# Patient Record
(demographics unavailable — no encounter records)

---

## 2025-06-27 NOTE — HEALTH RISK ASSESSMENT
[Good] : ~his/her~  mood as  good [Yes] : Yes [2 - 3 times a week (3 pts)] : 2 - 3  times a week (3 points) [1 or 2 (0 pts)] : 1 or 2 (0 points) [Never (0 pts)] : Never (0 points) [No] : In the past 12 months have you used drugs other than those required for medical reasons? 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

## 2025-06-27 NOTE — HISTORY OF PRESENT ILLNESS
[FreeTextEntry1] : CPE [de-identified] : 62yo male with PMH of T2DM, hypercholesterolemia who presents to the office for annual physical examination.   Patient reports that he is feeling well today.   He is concerned about left ear congestion. Reports sensation of the ear being clogged. Admits itchy as well. Reports constant for the past year.   Performed colonoscopy with [facility]  last year in 2024, reports he was told to repeat in 10 years.   Has been experiencing intermittent left chest discomfort, seen by cardiologist for work up and told normal. He believes the pain may be due to his history of cervical spine disc herniations. He did undergo cortisone injections with [facility] about 1.5 years ago and reports the pain was worse, so he stopped following up.   Had vitiligo after having COVID-19 vaccination. Follows with dermatologist.

## 2025-06-27 NOTE — REVIEW OF SYSTEMS
[Earache] : earache [Joint Pain] : joint pain [Muscle Pain] : muscle pain [Fever] : no fever [Chills] : no chills [Vision Problems] : no vision problems [Nasal Discharge] : no nasal discharge [Sore Throat] : no sore throat [Chest Pain] : no chest pain [Palpitations] : no palpitations [Shortness Of Breath] : no shortness of breath [Cough] : no cough [Abdominal Pain] : no abdominal pain [Nausea] : no nausea [Constipation] : no constipation [Diarrhea] : no diarrhea [Vomiting] : no vomiting [Dysuria] : no dysuria [Nocturia] : no nocturia [Frequency] : no frequency [Itching] : no itching [Skin Rash] : no skin rash [Headache] : no headache [Dizziness] : no dizziness

## 2025-06-27 NOTE — ASSESSMENT
[Vaccines Reviewed] : Immunizations reviewed today. Please see immunization details in the vaccine log within the immunization flowsheet.  [FreeTextEntry1] : #HCM - Patient presenting for annual physical exam - Declines flu vaccine today - Up to date with colonoscopy, repeat in 2034 - ECG shows NSR, rate 87 with no ST or T wave inversions - Will check routine blood work today and call patient with results   #T2DM - Diet controlled - Last A1c 6.4 in 2024 - Recheck A1c today  #Hypercholesterolemia - Repeat today   #Impacted cerumen - Patient presenting to the office with left ear impacted cerumen - Hydrogen peroxide solution with water irrigated into ear with removal of ear wax build up - TM visualized after procedure - Patient tolerated well